# Patient Record
Sex: MALE | Race: WHITE
[De-identification: names, ages, dates, MRNs, and addresses within clinical notes are randomized per-mention and may not be internally consistent; named-entity substitution may affect disease eponyms.]

---

## 2020-10-14 ENCOUNTER — HOSPITAL ENCOUNTER (EMERGENCY)
Dept: HOSPITAL 61 - ER | Age: 55
Discharge: HOME | End: 2020-10-14
Payer: SELF-PAY

## 2020-10-14 VITALS — WEIGHT: 189.6 LBS | BODY MASS INDEX: 25.68 KG/M2 | HEIGHT: 72 IN

## 2020-10-14 VITALS — DIASTOLIC BLOOD PRESSURE: 78 MMHG | SYSTOLIC BLOOD PRESSURE: 127 MMHG

## 2020-10-14 DIAGNOSIS — R11.0: ICD-10-CM

## 2020-10-14 DIAGNOSIS — R07.89: ICD-10-CM

## 2020-10-14 DIAGNOSIS — R10.13: ICD-10-CM

## 2020-10-14 DIAGNOSIS — I11.9: ICD-10-CM

## 2020-10-14 DIAGNOSIS — R06.00: ICD-10-CM

## 2020-10-14 DIAGNOSIS — K52.9: Primary | ICD-10-CM

## 2020-10-14 LAB
ALBUMIN SERPL-MCNC: 3.6 G/DL (ref 3.4–5)
ALBUMIN/GLOB SERPL: 1 {RATIO} (ref 1–1.7)
ALP SERPL-CCNC: 146 U/L (ref 46–116)
ALT SERPL-CCNC: 74 U/L (ref 16–63)
ANION GAP SERPL CALC-SCNC: 7 MMOL/L (ref 6–14)
AST SERPL-CCNC: 30 U/L (ref 15–37)
BASOPHILS # BLD AUTO: 0.1 X10^3/UL (ref 0–0.2)
BASOPHILS NFR BLD: 1 % (ref 0–3)
BILIRUB SERPL-MCNC: 0.6 MG/DL (ref 0.2–1)
BUN SERPL-MCNC: 17 MG/DL (ref 8–26)
BUN/CREAT SERPL: 14 (ref 6–20)
CALCIUM SERPL-MCNC: 9 MG/DL (ref 8.5–10.1)
CHLORIDE SERPL-SCNC: 102 MMOL/L (ref 98–107)
CO2 SERPL-SCNC: 27 MMOL/L (ref 21–32)
CREAT SERPL-MCNC: 1.2 MG/DL (ref 0.7–1.3)
EOSINOPHIL NFR BLD: 0.1 X10^3/UL (ref 0–0.7)
EOSINOPHIL NFR BLD: 3 % (ref 0–3)
ERYTHROCYTE [DISTWIDTH] IN BLOOD BY AUTOMATED COUNT: 14.5 % (ref 11.5–14.5)
GFR SERPLBLD BASED ON 1.73 SQ M-ARVRAT: 63.3 ML/MIN
GLUCOSE SERPL-MCNC: 131 MG/DL (ref 70–99)
HCT VFR BLD CALC: 49.6 % (ref 39–53)
HGB BLD-MCNC: 17.2 G/DL (ref 13–17.5)
LIPASE: 486 U/L (ref 73–393)
LYMPHOCYTES # BLD: 1.9 X10^3/UL (ref 1–4.8)
LYMPHOCYTES NFR BLD AUTO: 34 % (ref 24–48)
MAGNESIUM SERPL-MCNC: 2.3 MG/DL (ref 1.8–2.4)
MCH RBC QN AUTO: 30 PG (ref 25–35)
MCHC RBC AUTO-ENTMCNC: 35 G/DL (ref 31–37)
MCV RBC AUTO: 86 FL (ref 79–100)
MONO #: 0.4 X10^3/UL (ref 0–1.1)
MONOCYTES NFR BLD: 8 % (ref 0–9)
NEUT #: 3.2 X10^3/UL (ref 1.8–7.7)
NEUTROPHILS NFR BLD AUTO: 55 % (ref 31–73)
PLATELET # BLD AUTO: 265 X10^3/UL (ref 140–400)
POTASSIUM SERPL-SCNC: 4 MMOL/L (ref 3.5–5.1)
PROT SERPL-MCNC: 7.3 G/DL (ref 6.4–8.2)
RBC # BLD AUTO: 5.78 X10^6/UL (ref 4.3–5.7)
SODIUM SERPL-SCNC: 136 MMOL/L (ref 136–145)
WBC # BLD AUTO: 5.7 X10^3/UL (ref 4–11)

## 2020-10-14 PROCEDURE — 83735 ASSAY OF MAGNESIUM: CPT

## 2020-10-14 PROCEDURE — 84484 ASSAY OF TROPONIN QUANT: CPT

## 2020-10-14 PROCEDURE — 83880 ASSAY OF NATRIURETIC PEPTIDE: CPT

## 2020-10-14 PROCEDURE — 93005 ELECTROCARDIOGRAM TRACING: CPT

## 2020-10-14 PROCEDURE — 99285 EMERGENCY DEPT VISIT HI MDM: CPT

## 2020-10-14 PROCEDURE — 71045 X-RAY EXAM CHEST 1 VIEW: CPT

## 2020-10-14 PROCEDURE — 36415 COLL VENOUS BLD VENIPUNCTURE: CPT

## 2020-10-14 PROCEDURE — 85025 COMPLETE CBC W/AUTO DIFF WBC: CPT

## 2020-10-14 PROCEDURE — 83690 ASSAY OF LIPASE: CPT

## 2020-10-14 PROCEDURE — 80053 COMPREHEN METABOLIC PANEL: CPT

## 2020-10-14 PROCEDURE — 74177 CT ABD & PELVIS W/CONTRAST: CPT

## 2020-10-14 NOTE — EKG
[facility]

              [address] [place] [address]

Test Date:    2020-10-14               Test Time:    10:57:15

Pat Name:     VICTORINO MEZA              Department:   

Patient ID:   [id number]           Room:          

Gender:       M                        Technician:   

:          1965               Requested By: HUSEYIN HALE

Order Number: [id number]           Reading MD:     

                                 Measurements

Intervals                              Axis          

Rate:         84                       P:            48

NJ:           138                      QRS:          51

QRSD:         92                       T:            65

QT:           342                                    

QTc:          407                                    

                           Interpretive Statements

SINUS RHYTHM

QRS(T) CONTOUR ABNORMALITY

CONSIDER ANTEROLATERAL MYOCARDIAL DAMAGE

POSSIBLY ABNORMAL ECG

RI6.01

No previous ECG available for comparison

## 2020-10-14 NOTE — PHYS DOC
Past Medical History


Past Medical History:  CAD, Hypertension





General Adult


EDM:


Chief Complaint:  CHEST PAIN





HPI:


HPI:


55-year-old male with significant history of hypertension, coronary artery 

disease s/p stents at [facility] in 2016, who presents for evaluation of chest press

ure, though points his epigastrium as area of discomfort.  The patient reports 

dull upper abdominal discomfort for the last couple days or so.  Associate with 

nausea and dyspnea.  Not compliant with home medication regimen.





Review of Systems:


Review of Systems:


Gen: No fever, chills. 


Eyes: No blurred vision, diplopia. 


ENT: No nasal congestion, sore throat. 


CV: No palpitations.  Reports chest pressure.


Resp. No cough.  Reports dyspnea.


GI: No vomiting.  Reports nausea, abd pain.


: No dysuria, hematuria. 


Neuro: No HA, dizziness, weakness.


MSK: No myalgia, arthralgia, back pain. 


Skin: No acute rash or lesion.





Heart Score:


Risk Factors:


Risk Factors:  DM, Current or recent (<one month) smoker, HTN, HLP, family 

history of CAD, obesity.


Risk Scores:


Score 0 - 3:  2.5% MACE over next 6 weeks - Discharge Home


Score 4 - 6:  20.3% MACE over next 6 weeks - Admit for Clinical Observation


Score 7 - 10:  72.7% MACE over next 6 weeks - Early Invasive Strategies





Physical Exam:


PE:


Gen: NAD. Well nourished. 


Head: NC/AT. 


Eyes: No scleral icterus. No conjunctival injection. 


ENT: MMM. Posterior OP clear. 


Neck: Supple. NT. No JVD. 


CV: RRR.  Peripheral pulses intact. 


Resp: CTAB.  


Abd: Soft. NT. ND. 


MSK: No peripheral cyanosis. No edema.  No calf tenderness or asymmetry.


Neuro: Awake and alert.


Skin. Warm. Dry. No acute rash. 


Psych: Appropriate mood & affect.





EKG:


EKG:


EKG performed at 1057.  Sinus rhythm.  Heart rate 84.  Normal intervals.  

Nonspecific ST-T changes in the precordial leads.  No STEMI criteria met.  

Interpreted by me.





Radiology/Procedures:


Radiology/Procedures:


CT abdomen and pelvis with contrast  


 


History: Epigastric right upper quadrant pain


 


Technique:  After the administration of intravenous contrast, CT imaging 


was performed of the abdomen and pelvis. No oral contrast was given. 


Multiplanar images are reviewed.  Exposure: One or more of the following 


individualized dose reduction techniques were utilized for this 


examination:  1. Automated exposure control  2. Adjustment of the mA 


and/or kV according to patient size  3. Use of iterative reconstruction 


technique.


 


Comparison:  None


 


Findings:


There is no significant abnormality of the visualized lung bases. There is


coronary calcification. There is no significant abnormality of the liver, 


spleen, pancreas, adrenal glands. There is accessory spleen. Both kidneys 


enhance without hydronephrosis. Gallbladder is present, contracted 


appearance with likely at least borderline wall thickening.


 


Accurate evaluation of bowel is limited without oral contrast. There is 


appearance of degree of long segment small bowel wall thickening greatest 


in the left upper quadrant of the proximal small bowel. There is also 


appearance of wall prominence of the ascending colon and hepatic flexure. 


There is no free fluid or free air. There is scattered mild colonic 


diverticulosis, no significant localized inflammatory change. There is 


probable visualization of small caliber appendix without adjacent 


inflammatory change. There is scattered plaque of the abdominal aorta, 


minimally of the right iliac artery. There is mild fat in the left 


inguinal canal, no bowel. There is facet degenerative change greater 


inferiorly of the lumbar spine, minimal grade 1 anterior spondylolisthesis


L4-5. There is degree of lateral recess stenosis bilaterally at L4-5 and 


L3-4.


 


Impression:


1.  There is nonspecific contracted appearance of the gallbladder, likely 


at least borderline wall thickening. Mild ascending wall thickening as 


could be seen with mild colitis is not excluded.


2. There is appearance of degree of small bowel wall thickening greater in


the left upper quadrant as could be seen with enteritis in the appropriate


clinical setting.


 


 


Electronically signed by: Jerson Harris MD (10/14/2020 1:33 PM) 


[facility]





Course & Med Decision Making:


Course & Med Decision Making


Pertinent Labs and Imaging studies reviewed. (See chart for details)





In summary, 55-year-old male who presents for evaluation of lower chest and 

upper abdominal discomfort and dyspnea.  Benign abdominal examination.  Negative

Acosta's and Rovsing sign.  No flank percussion tenderness.  EKG shows no acute 

injury pattern.  Troponin negative x2.  Mildly elevated lipase with a known 

history of pancreatitis.  Therefore, CT abdomen/pelvis was obtained and shows 

possible colitis/enteritis.  Contracted gallbladder, not currently suspicious 

for acute cholecystitis.  We discharged home with treatment including Augmentin,

Flagyl, Zofran.  Return precautions given.





Dragon Disclaimer:


Dragon Disclaimer:


This electronic medical record was generated, in whole or in part, using a voice

recognition dictation system.





Departure


Departure


Impression:  


   Primary Impression:  


   Acute colitis


   Additional Impression:  


   Enteritis


Disposition:  01 DC HOME SELF CARE/HOMELESS


Condition:  STABLE


Referrals:  


NO PCP (PCP)


Patient Instructions:  Colitis


Scripts


Ondansetron (ONDANSETRON ODT) 4 Mg Tab.rapdis


1 TAB PO PRN Q6-8HRS, #16 TAB


   Prov: LE,HUSEYIN H DO         10/14/20 


Metronidazole (FLAGYL) 500 Mg Tablet


1 TAB PO Q12H, #20 TAB


   Prov: LE,HUSEYIN H DO         10/14/20 


Amoxicillin/Potassium Clav (AUGMENTIN 875-125 TABLET) 1 Each Tablet


1 TAB PO Q12HR, #20 TAB


   Prov: LE,HUSEYIN H DO         10/14/20











LE,HUSEYIN H DO                    Oct 14, 2020 11:07

## 2020-10-14 NOTE — RAD
PORTABLE CHEST 1V

 

History: Chest pain

 

Comparison: September 4, 2004

 

Findings:

Single view of the chest is submitted. 

There is a somewhat lesser degree of inspiration for this exam. There is 

no lobar infiltrate, dependent pleural fluid, or pneumothorax. Heart size 

is probably unchanged allowing for differences in technique.

 

Impression: 

 

1.  There is no convincing radiographic evidence of acute cardiopulmonary 

disease.

 

Electronically signed by: Jerson Harris MD (10/14/2020 12:15 PM) 

[facility]

## 2020-10-14 NOTE — RAD
CT abdomen and pelvis with contrast  

 

History: Epigastric right upper quadrant pain

 

Technique:  After the administration of intravenous contrast, CT imaging 

was performed of the abdomen and pelvis. No oral contrast was given. 

Multiplanar images are reviewed.  Exposure: One or more of the following 

individualized dose reduction techniques were utilized for this 

examination:  1. Automated exposure control  2. Adjustment of the mA 

and/or kV according to patient size  3. Use of iterative reconstruction 

technique.

 

Comparison:  None

 

Findings:

There is no significant abnormality of the visualized lung bases. There is

coronary calcification. There is no significant abnormality of the liver, 

spleen, pancreas, adrenal glands. There is accessory spleen. Both kidneys 

enhance without hydronephrosis. Gallbladder is present, contracted 

appearance with likely at least borderline wall thickening.

 

Accurate evaluation of bowel is limited without oral contrast. There is 

appearance of degree of long segment small bowel wall thickening greatest 

in the left upper quadrant of the proximal small bowel. There is also 

appearance of wall prominence of the ascending colon and hepatic flexure. 

There is no free fluid or free air. There is scattered mild colonic 

diverticulosis, no significant localized inflammatory change. There is 

probable visualization of small caliber appendix without adjacent 

inflammatory change. There is scattered plaque of the abdominal aorta, 

minimally of the right iliac artery. There is mild fat in the left 

inguinal canal, no bowel. There is facet degenerative change greater 

inferiorly of the lumbar spine, minimal grade 1 anterior spondylolisthesis

L4-5. There is degree of lateral recess stenosis bilaterally at L4-5 and 

L3-4.

 

Impression:

1.  There is nonspecific contracted appearance of the gallbladder, likely 

at least borderline wall thickening. Mild ascending wall thickening as 

could be seen with mild colitis is not excluded.

2. There is appearance of degree of small bowel wall thickening greater in

the left upper quadrant as could be seen with enteritis in the appropriate

clinical setting.

 

 

Electronically signed by: Jerson Harris MD (10/14/2020 1:33 PM) 

[facility]